# Patient Record
Sex: MALE | Race: WHITE | NOT HISPANIC OR LATINO | Employment: FULL TIME | URBAN - METROPOLITAN AREA
[De-identification: names, ages, dates, MRNs, and addresses within clinical notes are randomized per-mention and may not be internally consistent; named-entity substitution may affect disease eponyms.]

---
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
## 2018-10-18 ENCOUNTER — HOSPITAL ENCOUNTER (EMERGENCY)
Facility: HOSPITAL | Age: 18
Discharge: HOME | End: 2018-10-18
Attending: EMERGENCY MEDICINE

## 2018-10-18 VITALS
HEART RATE: 61 BPM | BODY MASS INDEX: 27.35 KG/M2 | RESPIRATION RATE: 20 BRPM | SYSTOLIC BLOOD PRESSURE: 119 MMHG | TEMPERATURE: 99 F | OXYGEN SATURATION: 97 % | WEIGHT: 206.38 LBS | DIASTOLIC BLOOD PRESSURE: 62 MMHG | HEIGHT: 73 IN
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
## 2018-10-18 DIAGNOSIS — Z72.0 CHEWING TOBACCO USE: ICD-10-CM

## 2018-10-18 DIAGNOSIS — S06.0X1A CONCUSSION WITH LOSS OF CONSCIOUSNESS OF 30 MINUTES OR LESS, INITIAL ENCOUNTER: Primary | ICD-10-CM

## 2018-10-18 DIAGNOSIS — S01.511A LACERATION OF INTRAORAL SURFACE OF LIP, INITIAL ENCOUNTER: ICD-10-CM

## 2018-10-18 DIAGNOSIS — Z23 NEED FOR TD VACCINE: ICD-10-CM

## 2018-10-18 DIAGNOSIS — S80.02XA CONTUSION OF LEFT KNEE, INITIAL ENCOUNTER: ICD-10-CM

## 2018-10-18 PROCEDURE — 99284 EMERGENCY DEPT VISIT MOD MDM: CPT

## 2018-10-18 PROCEDURE — 90715 TDAP VACCINE 7 YRS/> IM: CPT | Performed by: PHYSICIAN ASSISTANT

## 2018-10-18 PROCEDURE — 63600175 PHARM REV CODE 636 W HCPCS: Performed by: PHYSICIAN ASSISTANT

## 2018-10-18 PROCEDURE — 90471 IMMUNIZATION ADMIN: CPT | Performed by: PHYSICIAN ASSISTANT

## 2018-10-18 RX ORDER — METHOCARBAMOL 750 MG/1
750 TABLET, FILM COATED ORAL 2 TIMES DAILY PRN
Qty: 15 TABLET | Refills: 0 | Status: SHIPPED | OUTPATIENT
Start: 2018-10-18 | End: 2020-04-10 | Stop reason: CLARIF

## 2018-10-18 RX ORDER — DICLOFENAC SODIUM 50 MG/1
50 TABLET, DELAYED RELEASE ORAL 3 TIMES DAILY PRN
Qty: 15 TABLET | Refills: 0 | Status: SHIPPED | OUTPATIENT
Start: 2018-10-18 | End: 2020-04-10 | Stop reason: ALTCHOICE

## 2018-10-18 RX ADMIN — CLOSTRIDIUM TETANI TOXOID ANTIGEN (FORMALDEHYDE INACTIVATED), CORYNEBACTERIUM DIPHTHERIAE TOXOID ANTIGEN (FORMALDEHYDE INACTIVATED), BORDETELLA PERTUSSIS TOXOID ANTIGEN (GLUTARALDEHYDE INACTIVATED), BORDETELLA PERTUSSIS FILAMENTOUS HEMAGGLUTININ ANTIGEN (FORMALDEHYDE INACTIVATED), BORDETELLA PERTUSSIS PERTACTIN ANTIGEN, AND BORDETELLA PERTUSSIS FIMBRIAE 2/3 ANTIGEN 0.5 ML: 5; 2; 2.5; 5; 3; 5 INJECTION, SUSPENSION INTRAMUSCULAR at 01:10
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
## 2020-04-09 ENCOUNTER — HOSPITAL ENCOUNTER (EMERGENCY)
Facility: HOSPITAL | Age: 20
Discharge: HOME | End: 2020-04-09
Attending: EMERGENCY MEDICINE
Payer: COMMERCIAL

## 2020-04-09 VITALS
BODY MASS INDEX: 33.33 KG/M2 | HEIGHT: 70 IN | SYSTOLIC BLOOD PRESSURE: 134 MMHG | OXYGEN SATURATION: 98 % | TEMPERATURE: 98 F | RESPIRATION RATE: 18 BRPM | WEIGHT: 232.81 LBS | DIASTOLIC BLOOD PRESSURE: 77 MMHG | HEART RATE: 67 BPM

## 2020-04-09 DIAGNOSIS — M79.645 FINGER PAIN, LEFT: Primary | ICD-10-CM

## 2020-04-09 PROCEDURE — 99283 EMERGENCY DEPT VISIT LOW MDM: CPT | Mod: ER

## 2020-04-09 PROCEDURE — 86703 HIV-1/HIV-2 1 RESULT ANTBDY: CPT

## 2020-04-09 RX ORDER — SULFAMETHOXAZOLE AND TRIMETHOPRIM 800; 160 MG/1; MG/1
1 TABLET ORAL 2 TIMES DAILY
Qty: 14 TABLET | Refills: 0 | Status: SHIPPED | OUTPATIENT
Start: 2020-04-09 | End: 2020-04-16

## 2020-04-09 NOTE — ED PROVIDER NOTES
Encounter Date: 4/9/2020       History     Chief Complaint   Patient presents with    Hand Pain     left ring finger swollen and paingul x 3-4 days, denies injury/trauma     The history is provided by the patient.   Pt with pain to left ring finger for several days. 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 Pt denies fever, cough sob.    Review of patient's allergies indicates:  No Known Allergies  History reviewed. No pertinent past medical history.  Past Surgical History:   Procedure Laterality Date    KNEE SURGERY Left     MYRINGOTOMY W/ TUBES       Family History   Problem Relation Age of Onset    No Known Problems Mother     No Known Problems Father      Social History     Tobacco Use    Smoking status: Never Smoker    Smokeless tobacco: Current User     Types: Chew   Substance Use Topics    Alcohol use: No    Drug use: No     Review of Systems   Musculoskeletal:        Left ring finger pain   All other systems reviewed and are negative.      Physical Exam     Initial Vitals [04/09/20 1546]   BP Pulse Resp Temp SpO2   134/77 67 18 98.3 °F (36.8 °C) 98 %      MAP       --         Physical Exam    Nursing note and vitals reviewed.  Constitutional: He appears well-developed and well-nourished.   HENT:   Head: Normocephalic and atraumatic.   Eyes: EOM are normal. Pupils are equal, round, and reactive to light.   Neck: Normal range of motion. Neck supple.   Cardiovascular: Normal rate and regular rhythm.   Pulmonary/Chest: Breath sounds normal.   Abdominal: Soft. Bowel sounds are normal.   Musculoskeletal: Normal range of motion.   Left Hand 4th digit mild erythema/ nonfluctuant, Non-tense, no fb appreciated. No felon No paronychia   Neurological: He is alert and oriented to person, place, and time. He has normal strength.   Skin: Skin is warm.   Psychiatric: He has a normal mood and affect. Thought content normal.         ED Course   Procedures  Labs Reviewed   HIV 1 / 2 ANTIBODY          Imaging Results     None     4:11 PM - Counseling: Spoke with the patient and discussed todays findings, in addition to providing specific details for the plan of care and counseling regarding the diagnosis and prognosis. Questions are answered at this time.  I do not feel that this represents a felon as finger pulp is not tense or fluctuant and will treat with abx                                     Clinical Impression:       ICD-10-CM ICD-9-CM   1. Finger pain, left M79.645 729.5         Disposition:   Disposition: Discharged  Condition: Stable                        Zev Soto MD  04/09/20 1577

## 2020-04-10 ENCOUNTER — HOSPITAL ENCOUNTER (EMERGENCY)
Facility: HOSPITAL | Age: 20
Discharge: HOME | End: 2020-04-11
Attending: EMERGENCY MEDICINE
Payer: COMMERCIAL

## 2020-04-10 VITALS
RESPIRATION RATE: 18 BRPM | SYSTOLIC BLOOD PRESSURE: 138 MMHG | OXYGEN SATURATION: 100 % | HEART RATE: 70 BPM | HEIGHT: 71 IN | DIASTOLIC BLOOD PRESSURE: 82 MMHG | WEIGHT: 235.88 LBS | BODY MASS INDEX: 33.02 KG/M2 | TEMPERATURE: 99 F

## 2020-04-10 DIAGNOSIS — L02.512 ABSCESS OF LEFT RING FINGER: Primary | ICD-10-CM

## 2020-04-10 LAB — HIV 1+2 AB+HIV1 P24 AG SERPL QL IA: NEGATIVE

## 2020-04-10 PROCEDURE — 10060 I&D ABSCESS SIMPLE/SINGLE: CPT | Mod: ER

## 2020-04-10 PROCEDURE — 25000003 PHARM REV CODE 250: Mod: ER | Performed by: EMERGENCY MEDICINE

## 2020-04-10 PROCEDURE — 99283 EMERGENCY DEPT VISIT LOW MDM: CPT | Mod: 25,ER

## 2020-04-10 PROCEDURE — 26010 DRAINAGE OF FINGER ABSCESS: CPT | Mod: ER

## 2020-04-10 RX ORDER — LIDOCAINE HYDROCHLORIDE 20 MG/ML
5 INJECTION, SOLUTION INFILTRATION; PERINEURAL
Status: COMPLETED | OUTPATIENT
Start: 2020-04-10 | End: 2020-04-10

## 2020-04-10 RX ADMIN — LIDOCAINE HYDROCHLORIDE 5 ML: 20 INJECTION, SOLUTION INFILTRATION; PERINEURAL at 11:04

## 2020-04-11 NOTE — ED PROVIDER NOTES
Encounter Date: 4/10/2020       History     Chief Complaint   Patient presents with    Hand Pain     c/o wound to left fourth finger. seen here yesterday for same s/s, today increased swelling, pain, and erythema.       Chief complaint: Left fourth finger swelling    History of present illness: 20 y/o male with c/o increasing swelling and pain to the distal pad of 4th left finger. Has NOT started his Bactrim DS yet. Pt does fool with crawfish, but denies any specific injury mechanism. Was seen yesterday and meds prescribed then. No fever or chills or feeling poorly. Pain well-localized to distal pad with increased swelling from yesterday. Severity is moderate. Location is distal pad of 4th finger of left hand. Timing is nonspecific. No accompanying symptoms.        Review of patient's allergies indicates:  No Known Allergies  History reviewed. No pertinent past medical history.  Past Surgical History:   Procedure Laterality Date    KNEE SURGERY Left     MYRINGOTOMY W/ TUBES       Family History   Problem Relation Age of Onset    No Known Problems Mother     No Known Problems Father      Social History     Tobacco Use    Smoking status: Never Smoker    Smokeless tobacco: Current User     Types: Chew   Substance Use Topics    Alcohol use: No    Drug use: No     Review of Systems   Constitutional: Negative for chills and fever.   Respiratory: Negative for shortness of breath.    Cardiovascular: Negative for chest pain.   Gastrointestinal: Negative for nausea and vomiting.   Skin:        Redness and swelling to tip of 4th finger left hand   Neurological: Negative for numbness.   All other systems reviewed and are negative.      Physical Exam     Initial Vitals   BP Pulse Resp Temp SpO2   04/10/20 2322 04/10/20 2322 04/10/20 2322 04/10/20 2329 04/10/20 2322   138/82 70 18 98.8 °F (37.1 °C) 100 %      MAP       --                Physical Exam    Nursing note and vitals reviewed.  Constitutional: He appears  well-developed and well-nourished.   HENT:   Head: Normocephalic and atraumatic.   Mouth/Throat: Oropharynx is clear and moist.   Eyes: Conjunctivae and EOM are normal.   Neck: Normal range of motion. Neck supple.   Cardiovascular: Normal rate, regular rhythm and intact distal pulses.   Pulmonary/Chest: No respiratory distress.   Musculoskeletal: Normal range of motion.        Hands:  Erythema with fluctuance approx 0.5cm to flexor tip of 4th finger left hand   Neurological: He is alert and oriented to person, place, and time. He has normal strength.   Skin: Skin is warm and dry.         ED Course   I & D - Incision and Drainage  Date/Time: 4/11/2020 12:32 AM  Performed by: Viet Serna MD  Authorized by: Viet Serna MD   Consent Done: Not Needed  Type: abscess  Body area: upper extremity  Location details: left ring finger  Anesthesia: digital block    Anesthesia:  Local Anesthetic: lidocaine 2% without epinephrine  Anesthetic total: 4 mL  Patient sedated: no  Scalpel size: 11  Incision type: single straight  Complexity: simple  Drainage: pus  Drainage amount: moderate  Wound treatment: incision and  wound left open  Complications: No  Specimens: No  Implants: No  Patient tolerance: Patient tolerated the procedure well with no immediate complications        Labs Reviewed - No data to display       Imaging Results    None       X-Rays:   Independently Interpreted Readings:   Other Readings:  Left 4th finger with no radiopaque FB noted and bones normal. Interpreted by me and viewed on PACS                                    Clinical Impression:                ICD-10-CM ICD-9-CM   1. Abscess of left ring finger L02.512 681.00            Viet Serna MD  04/12/20 1281

## 2020-06-23 ENCOUNTER — HOSPITAL ENCOUNTER (EMERGENCY)
Facility: HOSPITAL | Age: 20
Discharge: HOME | End: 2020-06-24
Attending: EMERGENCY MEDICINE
Payer: COMMERCIAL

## 2020-06-23 DIAGNOSIS — L02.212 ABSCESS OF BACK: Primary | ICD-10-CM

## 2020-06-23 PROCEDURE — 10060 I&D ABSCESS SIMPLE/SINGLE: CPT | Mod: ER

## 2020-06-23 PROCEDURE — 99283 EMERGENCY DEPT VISIT LOW MDM: CPT | Mod: 25,ER

## 2020-06-24 VITALS
WEIGHT: 244.69 LBS | DIASTOLIC BLOOD PRESSURE: 84 MMHG | TEMPERATURE: 99 F | BODY MASS INDEX: 34.13 KG/M2 | RESPIRATION RATE: 20 BRPM | HEART RATE: 88 BPM | SYSTOLIC BLOOD PRESSURE: 138 MMHG | OXYGEN SATURATION: 100 %

## 2020-06-24 PROCEDURE — 10060 I&D ABSCESS SIMPLE/SINGLE: CPT | Mod: ER

## 2020-06-24 PROCEDURE — 25000003 PHARM REV CODE 250: Mod: ER | Performed by: EMERGENCY MEDICINE

## 2020-06-24 RX ORDER — CLINDAMYCIN HYDROCHLORIDE 300 MG/1
300 CAPSULE ORAL 4 TIMES DAILY
Qty: 28 CAPSULE | Refills: 0 | Status: SHIPPED | OUTPATIENT
Start: 2020-06-24 | End: 2020-07-01

## 2020-06-24 RX ORDER — LIDOCAINE HYDROCHLORIDE 20 MG/ML
5 INJECTION, SOLUTION INFILTRATION; PERINEURAL
Status: COMPLETED | OUTPATIENT
Start: 2020-06-24 | End: 2020-06-24

## 2020-06-24 RX ADMIN — LIDOCAINE HYDROCHLORIDE 5 ML: 20 INJECTION, SOLUTION INFILTRATION; PERINEURAL at 12:06

## 2020-06-24 NOTE — ED NOTES
Pt AAOx4, Pt wound was cleaned and dressed, dressing clean, dry and intact, No active bleeding noted. Pt had no further questions or concerns at this time. Pt ambulated out of the ED w/o difficulty or assistance.

## 2020-06-24 NOTE — ED PROVIDER NOTES
Encounter Date: 6/23/2020       History     Chief Complaint   Patient presents with    Abscess     c/o back abscess onset 2 days ago     Chief complaint: Abscess on back     History of present illness: 20 y/o male with c/o swelling to right mid back for 2 days.  States she tried to drain it was unsuccessful.  Continues to swell.  Location left upper mid back.  Aggravated by palpation or movement.  No ameliorating factors.  Denies accompanying symptoms of fever or chills.  No known trauma to the area.  No spontaneous drainage.  Ongoing.        Review of patient's allergies indicates:  No Known Allergies  History reviewed. No pertinent past medical history.  Past Surgical History:   Procedure Laterality Date    KNEE SURGERY Left     MYRINGOTOMY W/ TUBES       Family History   Problem Relation Age of Onset    No Known Problems Mother     No Known Problems Father      Social History     Tobacco Use    Smoking status: Never Smoker    Smokeless tobacco: Current User     Types: Chew   Substance Use Topics    Alcohol use: No    Drug use: No     Review of Systems   Constitutional: Negative.  Negative for chills and fever.   HENT: Negative.    Respiratory: Negative.  Negative for chest tightness and shortness of breath.    Cardiovascular: Negative.    Gastrointestinal: Negative for nausea and vomiting.   Genitourinary: Negative for difficulty urinating and flank pain.   Musculoskeletal: Positive for back pain.   Skin:        Abscess as noted   Neurological: Positive for numbness. Negative for weakness.   Hematological: Negative for adenopathy. Does not bruise/bleed easily.   All other systems reviewed and are negative.      Physical Exam     Initial Vitals [06/23/20 2353]   BP Pulse Resp Temp SpO2   (!) 140/82 86 18 99 °F (37.2 °C) 100 %      MAP       --         Physical Exam    Nursing note and vitals reviewed.  Constitutional: He appears well-developed and well-nourished.   HENT:   Head: Normocephalic and  atraumatic.   Right Ear: External ear normal.   Left Ear: External ear normal.   Nose: Nose normal.   Mouth/Throat: Oropharynx is clear and moist.   Eyes: Conjunctivae and EOM are normal. Pupils are equal, round, and reactive to light.   Neck: Normal range of motion. Neck supple.   Cardiovascular: Normal rate, regular rhythm and intact distal pulses.   Pulmonary/Chest: Breath sounds normal.   Neurological: He is alert and oriented to person, place, and time. He has normal strength.   Skin: Skin is warm and dry. Abscess noted.        approx 3 cm in diameter with central crusting and no spontaneous drainage as shown.   Psychiatric: He has a normal mood and affect. His behavior is normal. Judgment and thought content normal.         ED Course   I & D - Incision and Drainage    Date/Time: 6/24/2020 12:39 AM  Performed by: Viet Serna MD  Authorized by: Viet Serna MD   Consent Done: Not Needed  Type: abscess  Body area: trunk  Location details: back  Anesthesia: local infiltration    Anesthesia:  Local Anesthetic: lidocaine 1% without epinephrine  Anesthetic total: 2 mL  Patient sedated: no  Scalpel size: 11  Incision type: single straight  Complexity: simple  Drainage: bloody  Drainage amount: moderate  Wound treatment: incision  Packing material: 1/4 in gauze  Complications: No  Specimens: No  Implants: No  Patient tolerance: Patient tolerated the procedure well with no immediate complications        Labs Reviewed - No data to display       Imaging Results    None          Medical Decision Making:   Differential Diagnosis:   Abscess/infected sebaceous cyst  No capsular thickening noted on entry with #11 blade and no sebum or significant pus noted.  Could be infected hematoma. Packed with instructions to return in 2 days for recheck and packing removal.                                 Clinical Impression:             ICD-10-CM ICD-9-CM   1. Abscess of back  L02.212 682.2          Viet Serna  MD  06/24/20 0052

## 2020-06-24 NOTE — DISCHARGE INSTRUCTIONS
Packing should be removed in 2 days.  Change dressing as needed for saturation  Return sooner if increased pain, swelling or fever and chills.

## 2021-03-08 ENCOUNTER — HOSPITAL ENCOUNTER (EMERGENCY)
Facility: HOSPITAL | Age: 21
Discharge: HOME | End: 2021-03-08
Attending: EMERGENCY MEDICINE
Payer: COMMERCIAL

## 2021-03-08 VITALS
OXYGEN SATURATION: 100 % | HEART RATE: 68 BPM | DIASTOLIC BLOOD PRESSURE: 72 MMHG | RESPIRATION RATE: 16 BRPM | SYSTOLIC BLOOD PRESSURE: 119 MMHG | HEIGHT: 71 IN | BODY MASS INDEX: 37 KG/M2 | TEMPERATURE: 98 F | WEIGHT: 264.31 LBS
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
## 2021-03-08 DIAGNOSIS — S50.862A INSECT BITE OF LEFT FOREARM, INITIAL ENCOUNTER: Primary | ICD-10-CM

## 2021-03-08 PROCEDURE — 99282 EMERGENCY DEPT VISIT SF MDM: CPT | Mod: ER
